# Patient Record
Sex: MALE | Race: WHITE | URBAN - METROPOLITAN AREA
[De-identification: names, ages, dates, MRNs, and addresses within clinical notes are randomized per-mention and may not be internally consistent; named-entity substitution may affect disease eponyms.]

---

## 2022-04-06 ENCOUNTER — OFFICE VISIT (OUTPATIENT)
Dept: URBAN - METROPOLITAN AREA CLINIC 23 | Facility: CLINIC | Age: 77
End: 2022-04-06
Payer: MEDICARE

## 2022-04-06 DIAGNOSIS — H25.13 AGE-RELATED NUCLEAR CATARACT, BILATERAL: Primary | ICD-10-CM

## 2022-04-06 PROCEDURE — 99204 OFFICE O/P NEW MOD 45 MIN: CPT | Performed by: OPTOMETRIST

## 2022-04-06 ASSESSMENT — VISUAL ACUITY
OS: 20/60
OD: 20/40

## 2022-04-06 ASSESSMENT — INTRAOCULAR PRESSURE
OS: 13
OD: 13

## 2022-04-06 ASSESSMENT — KERATOMETRY
OD: 42.63
OS: 43.00

## 2022-04-06 NOTE — IMPRESSION/PLAN
Impression: Age-related nuclear cataract, bilateral: H25.13 Bilateral. Condition: moderate. Plan: Cataracts account for the patient's complaints. Patient understands changing glasses will not improve vision. Patient desires to have surgery, recommend surgical consult. Briefly discussed advanced technology options.

## 2022-08-04 NOTE — IMPRESSION/PLAN
Impression: S/P Cataract Extraction by phacoemulsification with IOL placement; ORA; Liana Ring 6.25mm- 06398 OD - 1 Day. Encounter for surgical aftercare following surgery on a sense organ  Z48.810. Post operative instructions reviewed - Plan: Use medication as directed above and on handout. Reviewed drop schedule with patient. Return if vision suddenly changes or pain increases.

## 2022-08-12 NOTE — IMPRESSION/PLAN
Impression: S/P Cataract Extraction by phacoemulsification with IOL placement; ORA; Liana Ring 6.25mm- [id number] OD - 9 Days. Encounter for surgical aftercare following surgery on a sense organ  Z48.810. Post operative instructions reviewed - Condition is improving - Plan: Use medication as directed above and on handout. Okay to proceed with second eye. Return if vision suddenly changes or pain increases.

## 2022-08-24 NOTE — IMPRESSION/PLAN
Impression: S/P Cataract Extraction by phacoemulsification with IOL placement; ORA; Sample Dexycu OS - 1 Day. Presence of intraocular lens  Z96.1. Post operative instructions reviewed - Plan: Return as scheduled, sooner if vision suddenly changes or pain increases. Epilated misdirected eyelash on RLL. Discussed pt. may see bump on conjunctiva -DEXYCU.

## 2022-09-01 NOTE — IMPRESSION/PLAN
Impression: S/P Cataract Extraction by phacoemulsification with IOL placement; ORA; Sample Dexycu OS - 9 Days. Presence of intraocular lens  Z96.1. Post operative instructions reviewed - Condition is improving - Plan: Discussed findings. Continue taper of Prednisolone in OD. Call with any vision changes or worsening symptoms.

## 2022-10-04 NOTE — IMPRESSION/PLAN
Impression: S/P Cataract Extraction by phacoemulsification with IOL placement; ORA; Sample Dexycu OS - 42 Days. Presence of intraocular lens  Z96.1. Condition is improving - Plan: Discussed findings. Continue use of OTC readers for near vision. Patient reports lids are drooping and bothersome, recommend consult with oculoplastic specialist for possible treatment. Call with any further vision changes.

## 2023-04-18 NOTE — IMPRESSION/PLAN
Impression: Vitreous degeneration, bilateral: H43.813. Bilateral. Condition: quality of life issue. Plan: Discussed findings, OPTOS photos ordered and reviewed. PVD noted in OD, unable to find PVD in OS on fundus exam or on photos but pt symptomatic in OS. Discussed option for vitrectomy consult if floater continues to be bothersome.